# Patient Record
Sex: MALE | Race: AMERICAN INDIAN OR ALASKA NATIVE
[De-identification: names, ages, dates, MRNs, and addresses within clinical notes are randomized per-mention and may not be internally consistent; named-entity substitution may affect disease eponyms.]

---

## 2020-04-20 ENCOUNTER — HOSPITAL ENCOUNTER (EMERGENCY)
Dept: HOSPITAL 5 - ED | Age: 25
Discharge: HOME | End: 2020-04-20
Payer: COMMERCIAL

## 2020-04-20 VITALS — SYSTOLIC BLOOD PRESSURE: 118 MMHG | DIASTOLIC BLOOD PRESSURE: 79 MMHG

## 2020-04-20 DIAGNOSIS — F41.9: Primary | ICD-10-CM

## 2020-04-20 PROCEDURE — 71046 X-RAY EXAM CHEST 2 VIEWS: CPT

## 2020-04-20 NOTE — XRAY REPORT
CHEST 2 VIEWS 



INDICATION / CLINICAL INFORMATION:

fever.



COMPARISON: 

None available.



FINDINGS:



SUPPORT DEVICES: None.

HEART / MEDIASTINUM: No significant abnormality. 

LUNGS / PLEURA: No significant pulmonary or pleural abnormality. No pneumothorax. 



ADDITIONAL FINDINGS: No significant additional findings.



IMPRESSION:

No significant abnormality



Signer Name: Maikol Collins MD FACR 

Signed: 4/20/2020 11:42 AM

Workstation Name: [employer]

## 2020-04-20 NOTE — EMERGENCY DEPARTMENT REPORT
ED General Adult HPI





- General


Chief complaint: Upper Respiratory Infection


Stated complaint: CHILLS


Time Seen by Provider: 04/20/20 10:57


Source: patient


Mode of arrival: Ambulatory


Limitations: No Limitations





- History of Present Illness


Initial comments: 





24 YO AA MALE COMES TO ER WITH SUBJECTIVE CHILLS. NO FEVER. NO SOB. NO COUGH. NO

CHEST PAIN. 


NO ABD PAIN. NO N/V/D.


NO DYSURIA OR BACK PAIN


NO HEADACHE. 





REPORTS FEELING ANXIOUS ABOUT COVID


HE HAS NO EXPOSURE


HE HAS BEEN AT HOME


THIS IS CAUSING HIM NOT TO BE ABLE TO SLEEP





NO MENTAL HEALTH HX


NO HI


NO SI








-: Gradual, days(s)


Consistency: intermittent


Improves with: none


Worsens with: none


Associated Symptoms: denies other symptoms


Treatments Prior to Arrival: none





- Related Data


                                  Previous Rx's











 Medication  Instructions  Recorded  Last Taken  Type


 


hydrOXYzine PAMOATE [Vistaril] 25 mg PO Q12H PRN #12 capsule 04/20/20 Unknown Rx














ED Review of Systems


ROS: 


Stated complaint: CHILLS


Other details as noted in HPI





Comment: All other systems reviewed and negative





ED Past Medical Hx





- Past Medical History


Previous Medical History?: No





- Surgical History


Past Surgical History?: No





- Family History


Family history: no significant





- Social History


Smoking Status: Never Smoker


Substance Use Type: None





- Medications


Home Medications: 


                                Home Medications











 Medication  Instructions  Recorded  Confirmed  Last Taken  Type


 


hydrOXYzine PAMOATE [Vistaril] 25 mg PO Q12H PRN #12 capsule 04/20/20  Unknown 

Rx














ED Physical Exam





- General


Limitations: No Limitations


General appearance: alert, in no apparent distress





- Head


Head exam: Present: atraumatic, normocephalic





- Eye


Eye exam: Present: normal appearance





- ENT


ENT exam: Present: mucous membranes moist





- Neck


Neck exam: Present: normal inspection





- Respiratory


Respiratory exam: Present: normal lung sounds bilaterally.  Absent: respiratory 

distress





- Cardiovascular


Cardiovascular Exam: Present: regular rate, normal rhythm.  Absent: systolic 

murmur, diastolic murmur, rubs, gallop





- GI/Abdominal


GI/Abdominal exam: Present: soft, normal bowel sounds





- Rectal


Rectal exam: Present: deferred





- Extremities Exam


Extremities exam: Present: normal inspection





- Back Exam


Back exam: Present: normal inspection





- Neurological Exam


Neurological exam: Present: alert, oriented X3





- Psychiatric


Psychiatric exam: Present: normal affect, normal mood





- Skin


Skin exam: Present: warm, dry, intact, normal color.  Absent: rash





ED Course


                                   Vital Signs











  04/20/20





  10:37


 


Temperature 98.3 F


 


Pulse Rate 92 H


 


Respiratory 18





Rate 


 


Blood Pressure 118/79


 


O2 Sat by Pulse 99





Oximetry 














ED Medical Decision Making





- Radiology Data


Radiology results: report reviewed, image reviewed





- Medical Decision Making





XRAY NEG








                                   Vital Signs











  04/20/20





  10:37


 


Temperature 98.3 F


 


Pulse Rate 92 H


 


Respiratory 18





Rate 


 


Blood Pressure 118/79


 


O2 Sat by Pulse 99





Oximetry 








PT REASSURED





EDUCATED ON COVID PREVENTION


[facility] FOR ANXIETY SLEEP





DC HOME WITH PCP FOLLOW UP. 





- Differential Diagnosis


RO PNA


Critical care attestation.: 


If time is entered above; I have spent that time in minutes in the direct care 

of this critically ill patient, excluding procedure time.








ED Disposition


Clinical Impression: 


 Anxiety





Disposition: DC-01 TO HOME OR SELFCARE


Is pt being admited?: No


Does the pt Need Aspirin: No


Condition: Stable


Additional Instructions: 


IF YOU FEEL LIKE YOU HAVE CHILLS- TAKE TEMPERATURE





MOTRIN OR TYLENOL FOR PAIN OR FEVER 





FOLLOW UP WITH PCP


REFERRAL BELOW





DIET AND ACTIVITY AS TOLERATED








Referrals: 


ALBARO SHEIKH MD [Staff Physician] - 3-5 Days


Time of Disposition: 11:49

## 2020-04-20 NOTE — EMERGENCY DEPARTMENT REPORT
Chief Complaint: Upper Respiratory Infection


Stated Complaint: CHILLS


Time Seen by Provider: 04/20/20 10:57





- HPI


History of Present Illness: 





2 d history chills


no sob


no cp


no abd pain


no n/v/d





- ROS


Review of Systems: 





chills- subjective


no other complaints





pmh


none





psh


none





no home rx





no cig/etoh/drugs





- Exam


Vital Signs: 


                                   Vital Signs











  04/20/20





  10:37


 


Temperature 98.3 F


 


Pulse Rate 92 H


 


Respiratory 18





Rate 


 


Blood Pressure 118/79


 


O2 Sat by Pulse 99





Oximetry 











Physical Exam: 





a/o


no focal def


lungs cta


ambulatory


non ill


abd snt





MSE screening note: 


Focused history and physical exam performed.


Due to findings the following was ordered:








Patient discussed with doctor:: CHARLIE ADAM





ED Disposition for MSE


Condition: Stable